# Patient Record
Sex: FEMALE | Race: WHITE | Employment: FULL TIME | URBAN - METROPOLITAN AREA
[De-identification: names, ages, dates, MRNs, and addresses within clinical notes are randomized per-mention and may not be internally consistent; named-entity substitution may affect disease eponyms.]

---

## 2017-08-23 ENCOUNTER — HOSPITAL ENCOUNTER (OUTPATIENT)
Age: 42
Discharge: HOME | End: 2017-08-23
Attending: FAMILY MEDICINE
Payer: COMMERCIAL

## 2017-08-23 VITALS
SYSTOLIC BLOOD PRESSURE: 115 MMHG | WEIGHT: 165 LBS | OXYGEN SATURATION: 97 % | TEMPERATURE: 98 F | RESPIRATION RATE: 22 BRPM | DIASTOLIC BLOOD PRESSURE: 75 MMHG | HEART RATE: 83 BPM

## 2017-08-23 DIAGNOSIS — S61.211A LACERATION OF LEFT INDEX FINGER WITHOUT FOREIGN BODY WITHOUT DAMAGE TO NAIL, INITIAL ENCOUNTER: Primary | ICD-10-CM

## 2017-08-23 PROCEDURE — 99203 OFFICE O/P NEW LOW 30 MIN: CPT

## 2017-08-23 PROCEDURE — 64450 NJX AA&/STRD OTHER PN/BRANCH: CPT

## 2017-08-23 PROCEDURE — 99204 OFFICE O/P NEW MOD 45 MIN: CPT

## 2017-08-23 RX ORDER — ONDANSETRON 4 MG/1
4 TABLET, ORALLY DISINTEGRATING ORAL EVERY 12 HOURS PRN
Qty: 10 TABLET | Refills: 0 | Status: SHIPPED | OUTPATIENT
Start: 2017-08-23 | End: 2017-08-30

## 2017-08-23 RX ORDER — HYDROCODONE BITARTRATE AND ACETAMINOPHEN 5; 325 MG/1; MG/1
1 TABLET ORAL ONCE
Status: COMPLETED | OUTPATIENT
Start: 2017-08-23 | End: 2017-08-23

## 2017-08-23 RX ORDER — HYDROCODONE BITARTRATE AND ACETAMINOPHEN 5; 325 MG/1; MG/1
1 TABLET ORAL EVERY 6 HOURS PRN
Qty: 30 TABLET | Refills: 1 | Status: SHIPPED | OUTPATIENT
Start: 2017-08-23 | End: 2017-08-30

## 2017-08-23 RX ORDER — GINSENG 100 MG
CAPSULE ORAL ONCE
Status: COMPLETED | OUTPATIENT
Start: 2017-08-23 | End: 2017-08-23

## 2017-08-24 ENCOUNTER — HOSPITAL ENCOUNTER (OUTPATIENT)
Age: 42
Discharge: HOME | End: 2017-08-24
Attending: PEDIATRICS
Payer: COMMERCIAL

## 2017-08-24 VITALS
SYSTOLIC BLOOD PRESSURE: 133 MMHG | OXYGEN SATURATION: 100 % | DIASTOLIC BLOOD PRESSURE: 91 MMHG | HEIGHT: 68 IN | RESPIRATION RATE: 16 BRPM | TEMPERATURE: 98 F | WEIGHT: 160 LBS | BODY MASS INDEX: 24.25 KG/M2 | HEART RATE: 74 BPM

## 2017-08-24 DIAGNOSIS — S61.311D LACERATION OF LEFT INDEX FINGER WITHOUT FOREIGN BODY WITH DAMAGE TO NAIL, SUBSEQUENT ENCOUNTER: Primary | ICD-10-CM

## 2017-08-24 PROCEDURE — 99212 OFFICE O/P EST SF 10 MIN: CPT

## 2017-08-24 NOTE — ED NOTES
Last dose of motrin 600 mg taken 30 mins ago. Pt was seen last night for laceration and states after changing dressing it began to bleed and became more painful.  Pt arrives with dressing in place and blood noted to dressing, bleeding controlled and no acti

## 2017-08-24 NOTE — ED PROVIDER NOTES
Patient Seen in: [address]    History   Patient presents with:  Laceration Abrasion (integumentary)    Stated Complaint: CUT FINGER    HPI    HPI: Teresa Reynaga is a 40year old female who presents after an injury to the body  NEURO:Sensation to touch is intact. SKIN: No open wounds, no rashes. PSYCH: Normal affect. Calm and cooperative.     Differential diagnosis to include fracture vs. Strain/sprain vs. contusion        ED Course   Labs Reviewed - No data to display  Wo

## 2017-08-24 NOTE — ED NOTES
Pt verbalized understanding of DC papers. Answered all questions. Discussed side effects of Norco. Pt will come back to have wound checked. Pt appears stable.   will drive pt home

## 2017-08-24 NOTE — ED INITIAL ASSESSMENT (HPI)
Pt was seen in IC last night for laceration reports changed bandage and began to bleed. States needs bandage changed and is complaining of worse pain. Was given RX for norco that she has not filled. Bandage in place bleeding controlled.

## 2017-08-24 NOTE — ED INITIAL ASSESSMENT (HPI)
Pt was chopping vegetables at home around 1930. Lac to left index finger. Unable to stop bleeding at home.

## 2017-08-24 NOTE — ED PROVIDER NOTES
Patient presents with:  Laceration Abrasion (integumentary)      HPI:     Moi Velazquez is a 40year old female who presents today for follow-up of left index finger injury sustained last night.   She was using a knife and cut off her distal fingertip throug appointment as soon as possible for a visit in 1 day

## 2019-01-23 ENCOUNTER — OFFICE VISIT (OUTPATIENT)
Dept: OBGYN CLINIC | Facility: CLINIC | Age: 44
End: 2019-01-23
Payer: COMMERCIAL

## 2019-01-23 VITALS
BODY MASS INDEX: 28.59 KG/M2 | WEIGHT: 184.31 LBS | HEIGHT: 67.5 IN | DIASTOLIC BLOOD PRESSURE: 74 MMHG | SYSTOLIC BLOOD PRESSURE: 106 MMHG

## 2019-01-23 DIAGNOSIS — Z12.4 SCREENING FOR MALIGNANT NEOPLASM OF THE CERVIX: ICD-10-CM

## 2019-01-23 DIAGNOSIS — Z30.431 ENCOUNTER FOR ROUTINE CHECKING OF INTRAUTERINE CONTRACEPTIVE DEVICE (IUD): ICD-10-CM

## 2019-01-23 DIAGNOSIS — R10.30 LOWER ABDOMINAL PAIN: ICD-10-CM

## 2019-01-23 DIAGNOSIS — Z01.419 WOMEN'S ANNUAL ROUTINE GYNECOLOGICAL EXAMINATION: Primary | ICD-10-CM

## 2019-01-23 DIAGNOSIS — K92.1 HEMATOCHEZIA: ICD-10-CM

## 2019-01-23 PROCEDURE — 99386 PREV VISIT NEW AGE 40-64: CPT | Performed by: OBSTETRICS & GYNECOLOGY

## 2019-01-23 PROCEDURE — 87624 HPV HI-RISK TYP POOLED RSLT: CPT | Performed by: OBSTETRICS & GYNECOLOGY

## 2019-01-23 NOTE — PROGRESS NOTES
NEW GYN H&P     1/23/2019  9:01 AM    CC: Patient is here to establish care    HPI: Patient is a 38year old [id number] LMP 1/20/2019 here for annual gyne exam, due for PAP and mammogram. Has concerns about recent bright red blood in stool with diffuse lower Known Problems Maternal Grandmother    • No Known Problems Maternal Grandfather    • No Known Problems Paternal Grandmother    • No Known Problems Paternal Grandfather      Social History    Socioeconomic History      Marital status:       Spouse na normal   Bladder: well supported  Vagina: normal mucosa, no lesions, no discharge   Uterus: normal size, mobile, nontender  Cervix: dark blood from recent menses at normal os, IUD string seen, no lesions or bleeding  Adnexa:normal size, bilaterally nontend

## 2019-01-24 ENCOUNTER — OFFICE VISIT (OUTPATIENT)
Dept: INTERNAL MEDICINE CLINIC | Facility: CLINIC | Age: 44
End: 2019-01-24
Payer: COMMERCIAL

## 2019-01-24 VITALS
HEART RATE: 68 BPM | OXYGEN SATURATION: 100 % | RESPIRATION RATE: 22 BRPM | SYSTOLIC BLOOD PRESSURE: 106 MMHG | HEIGHT: 67.5 IN | WEIGHT: 185 LBS | TEMPERATURE: 98 F | DIASTOLIC BLOOD PRESSURE: 60 MMHG | BODY MASS INDEX: 28.7 KG/M2

## 2019-01-24 DIAGNOSIS — R10.32 LEFT LOWER QUADRANT PAIN: ICD-10-CM

## 2019-01-24 DIAGNOSIS — Z00.00 GENERAL MEDICAL EXAM: ICD-10-CM

## 2019-01-24 DIAGNOSIS — K92.1: Primary | ICD-10-CM

## 2019-01-24 DIAGNOSIS — Z80.9 FAMILY HISTORY OF CANCER: ICD-10-CM
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
## 2019-01-24 PROBLEM — N60.01 CYST OF RIGHT BREAST: Status: ACTIVE | Noted: 2017-05-09

## 2019-01-24 PROBLEM — IMO0002 GENETIC COUNSELING AND TESTING: Status: ACTIVE | Noted: 2019-01-23
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
## 2019-01-24 LAB — HPV I/H RISK 1 DNA SPEC QL NAA+PROBE: NEGATIVE

## 2019-01-24 PROCEDURE — 99204 OFFICE O/P NEW MOD 45 MIN: CPT | Performed by: INTERNAL MEDICINE

## 2019-01-24 RX ORDER — HYDROCODONE BITARTRATE AND ACETAMINOPHEN 5; 325 MG/1; MG/1
TABLET ORAL
COMMUNITY

## 2019-01-24 RX ORDER — DOXYCYCLINE HYCLATE 50 MG/1
CAPSULE ORAL
COMMUNITY
End: 2019-01-24 | Stop reason: ALTCHOICE

## 2019-01-24 RX ORDER — CEPHALEXIN 500 MG/1
CAPSULE ORAL
COMMUNITY
End: 2019-01-24 | Stop reason: ALTCHOICE

## 2019-01-24 NOTE — PROGRESS NOTES
HPI:    Patient ID: Qi Lynn is a 38year old female. HPI   Patient is here to establish care. Previously healthy until recently since a month ago has intermittent loose stool accompanied by diffuse lower abdominal pain .  Noticed bright red bloo rectal pain. LLQ pain    Genitourinary: Negative for dysuria and breast mass. Musculoskeletal: Negative for joint swelling and joint pain. Allergic/Immunologic: Negative for immunocompromised state.    Neurological: Negative for dizziness, syncop diverticulitis, AVM. Encouraged to eat more fibers. Avoid popcorn, nuts. Consult GI , Dr. Marcell Olvera. . May need colonoscopy. - GI STOOL PANEL BY PCR; Future  - CBC W AUTO DIFF; Future  - COMP METABOLIC PANEL (14);  Future  - SED Loren WOOD

## 2021-03-30 ENCOUNTER — TELEMEDICINE (OUTPATIENT)
Dept: TELEHEALTH | Age: 46
End: 2021-03-30

## 2021-03-30 DIAGNOSIS — L03.116 CELLULITIS OF LEFT ANKLE: Primary | ICD-10-CM

## 2021-03-30 PROCEDURE — 99213 OFFICE O/P EST LOW 20 MIN: CPT | Performed by: NURSE PRACTITIONER

## 2021-03-30 RX ORDER — AMOXICILLIN AND CLAVULANATE POTASSIUM 875; 125 MG/1; MG/1
1 TABLET, FILM COATED ORAL 2 TIMES DAILY
Qty: 20 TABLET | Refills: 0 | Status: SHIPPED | OUTPATIENT
Start: 2021-03-30 | End: 2021-04-09

## 2021-03-30 NOTE — PATIENT INSTRUCTIONS
Cellulitis  Cellulitis is an infection of the deep layers of skin. A break in the skin, such as a cut or scratch, can let bacteria under the skin. If the bacteria get to deep layers of the skin, it can be serious.  If not treated, cellulitis can get into days on antibiotics  Ryan last reviewed this educational content on 8/1/2019  © 5797-0401 [employer]. All rights reserved. This information is not intended as a substitute for professional medical care.  Always follow your healthcare prof have been prescribed, be sure to take them as directed until they are all finished. · You may use over-the-counter pain medicine to control pain, unless another pain medicine was prescribed.  Talk with your healthcare provider before using acetaminophen or

## 2021-03-30 NOTE — PROGRESS NOTES
Gennaro Neves is a 40year old female. CHIEF COMPLAINT:   Patient presents with: Other: possible Lyme disease    Encounter was conducted via video.     HPI:   Patient states she was in a shoe store 3 days ago when her left ankle suddenly felt uncomfortable Prescriptions Disp Refills   • Amoxicillin-Pot Clavulanate 875-125 MG Oral Tab 20 tablet 0     Sig: Take 1 tablet by mouth 2 (two) times daily for 10 days. Patient Instructions     Cellulitis  Cellulitis is an infection of the deep layers of skin.  A right away if any of these occur:  · Red areas that spread  · Swelling or pain that gets worse  · Fluid leaking from the skin (pus)  · Fever higher of 100.4º F (38.0º C) or higher after 2 days on antibiotics  Ryan last reviewed this educational content glaucoma or if you are a man with trouble urinating due to an enlarged prostate. Some antihistamines cause less drowsiness and are a good choice for daytime use.   · If oral antibiotics have been prescribed, be sure to take them as directed until they are a

## 2022-06-01 ENCOUNTER — OFFICE VISIT (OUTPATIENT)
Dept: INTERNAL MEDICINE CLINIC | Facility: CLINIC | Age: 47
End: 2022-06-01
Payer: COMMERCIAL

## 2022-06-01 VITALS
BODY MASS INDEX: 30.71 KG/M2 | TEMPERATURE: 98 F | WEIGHT: 202.63 LBS | DIASTOLIC BLOOD PRESSURE: 64 MMHG | SYSTOLIC BLOOD PRESSURE: 110 MMHG | OXYGEN SATURATION: 98 % | HEART RATE: 83 BPM | HEIGHT: 68 IN

## 2022-06-01 DIAGNOSIS — L30.9 ECZEMA, UNSPECIFIED TYPE: ICD-10-CM

## 2022-06-01 DIAGNOSIS — K29.00 OTHER ACUTE GASTRITIS WITHOUT HEMORRHAGE: ICD-10-CM

## 2022-06-01 DIAGNOSIS — Z12.11 SCREENING FOR COLON CANCER: ICD-10-CM

## 2022-06-01 DIAGNOSIS — R10.30 LOWER ABDOMINAL PAIN: ICD-10-CM

## 2022-06-01 DIAGNOSIS — Z13.29 SCREENING FOR THYROID DISORDER: ICD-10-CM

## 2022-06-01 DIAGNOSIS — Z01.419 WELL WOMAN EXAM: ICD-10-CM

## 2022-06-01 DIAGNOSIS — Z13.0 SCREENING FOR DEFICIENCY ANEMIA: ICD-10-CM

## 2022-06-01 DIAGNOSIS — Z30.431 ENCOUNTER FOR ROUTINE CHECKING OF INTRAUTERINE CONTRACEPTIVE DEVICE (IUD): Primary | ICD-10-CM

## 2022-06-01 DIAGNOSIS — Z12.31 ENCOUNTER FOR SCREENING MAMMOGRAM FOR MALIGNANT NEOPLASM OF BREAST: ICD-10-CM

## 2022-06-01 DIAGNOSIS — Z13.220 SCREENING FOR LIPOID DISORDERS: ICD-10-CM

## 2022-06-01 DIAGNOSIS — E55.9 VITAMIN D DEFICIENCY: ICD-10-CM

## 2022-06-01 DIAGNOSIS — Z00.00 ANNUAL PHYSICAL EXAM: ICD-10-CM

## 2022-06-01 PROCEDURE — 3074F SYST BP LT 130 MM HG: CPT | Performed by: INTERNAL MEDICINE

## 2022-06-01 PROCEDURE — 99386 PREV VISIT NEW AGE 40-64: CPT | Performed by: INTERNAL MEDICINE

## 2022-06-01 PROCEDURE — 3078F DIAST BP <80 MM HG: CPT | Performed by: INTERNAL MEDICINE

## 2022-06-01 PROCEDURE — 3008F BODY MASS INDEX DOCD: CPT | Performed by: INTERNAL MEDICINE

## 2022-06-01 RX ORDER — OMEPRAZOLE 40 MG/1
40 CAPSULE, DELAYED RELEASE ORAL DAILY
Qty: 90 CAPSULE | Refills: 3 | Status: SHIPPED | OUTPATIENT
Start: 2022-06-01 | End: 2023-05-27

## 2022-07-21 ENCOUNTER — HOSPITAL ENCOUNTER (OUTPATIENT)
Age: 47
Discharge: HOME | End: 2022-07-21
Payer: COMMERCIAL

## 2022-07-21 VITALS
SYSTOLIC BLOOD PRESSURE: 127 MMHG | DIASTOLIC BLOOD PRESSURE: 84 MMHG | HEART RATE: 76 BPM | RESPIRATION RATE: 20 BRPM | TEMPERATURE: 98 F | OXYGEN SATURATION: 98 %

## 2022-07-21 DIAGNOSIS — S05.02XA ABRASION OF LEFT CORNEA, INITIAL ENCOUNTER: Primary | ICD-10-CM

## 2022-07-21 DIAGNOSIS — H10.32 ACUTE CONJUNCTIVITIS OF LEFT EYE, UNSPECIFIED ACUTE CONJUNCTIVITIS TYPE: ICD-10-CM

## 2022-07-21 PROCEDURE — 99213 OFFICE O/P EST LOW 20 MIN: CPT | Performed by: EMERGENCY MEDICINE

## 2022-07-21 RX ORDER — TETRACAINE HYDROCHLORIDE 5 MG/ML
1 SOLUTION OPHTHALMIC ONCE
Status: DISCONTINUED | OUTPATIENT
Start: 2022-07-21 | End: 2022-07-21

## 2022-07-21 RX ORDER — TETRACAINE HYDROCHLORIDE 5 MG/ML
1 SOLUTION OPHTHALMIC ONCE
Status: COMPLETED | OUTPATIENT
Start: 2022-07-21 | End: 2022-07-21

## 2022-07-21 RX ORDER — PURIFIED WATER 986 MG/ML
1 SOLUTION OPHTHALMIC ONCE
Status: DISCONTINUED | OUTPATIENT
Start: 2022-07-21 | End: 2022-07-21

## 2022-07-21 RX ORDER — PURIFIED WATER 986 MG/ML
1 SOLUTION OPHTHALMIC ONCE
Status: COMPLETED | OUTPATIENT
Start: 2022-07-21 | End: 2022-07-21

## 2022-07-21 RX ORDER — TOBRAMYCIN AND DEXAMETHASONE 3; 1 MG/ML; MG/ML
2 SUSPENSION/ DROPS OPHTHALMIC
Qty: 1 EACH | Refills: 0 | Status: SHIPPED | OUTPATIENT
Start: 2022-07-21 | End: 2022-07-26

## 2022-07-21 RX ORDER — IBUPROFEN 600 MG/1
600 TABLET ORAL ONCE
Status: COMPLETED | OUTPATIENT
Start: 2022-07-21 | End: 2022-07-21

## 2022-07-21 RX ORDER — IBUPROFEN 600 MG/1
600 TABLET ORAL EVERY 6 HOURS PRN
Qty: 20 TABLET | Refills: 0 | Status: SHIPPED | OUTPATIENT
Start: 2022-07-21

## 2022-07-21 NOTE — ED INITIAL ASSESSMENT (HPI)
Pt came in due to left eye pain that started this morning. Pt stated she was putting on make up and felt like something went inside her left eye. Pt stated she wears contacts and removed her contact lenses. Pt stated she began to have pain afterwards and sensitivity with light in left eye. Pt has easy non labored respirations.

## 2022-07-25 ENCOUNTER — TELEPHONE (OUTPATIENT)
Dept: OPHTHALMOLOGY | Facility: CLINIC | Age: 47
End: 2022-07-25

## 2023-02-16 NOTE — PATIENT INSTRUCTIONS
You were seen in clinic today for:    Chest pain  Based on exam, this is likely due to stomach symptoms, possibly related to recent activities from the weekend  -Your EKG was reassuring, there is a slight abnormality that we should monitor over time  - Lets have you obtain the blood work to rule out any other secondary causes - specifically, we need to rule out heart issues and blood clot as these may require ER evaluation  - We are also checking on the liver, gallbladder, pancreas levels as these are more benign causes of your symptoms  - Finally, if everything comes back normal, we should consider gastritis/acid reflux that we can manage medically  - We discussed red flag signs and symptoms of warrant immediate ER evaluation including severe 10 out of 10 chest pain, shortness of breath, shortness of breath with minimal exertion    Back pain with numbness and tingling  This may be related to your viral illness in 2020. You remain neurologically intact however.   We should consider sciatica or musculosketeal pain  - Let's await the work ups as above prior to initiating treatment  -Acetaminophen 500-650 mg every 4-6 hours as needed for pain relief  -Ibuprofen 200-400 mg every 8 hours as needed for anti-inflammatory and pain relief    Return to clinic in 4-6 weeks for follow-up

## 2023-02-17 ENCOUNTER — LAB ENCOUNTER (OUTPATIENT)
Dept: LAB | Age: 48
End: 2023-02-17
Attending: INTERNAL MEDICINE
Payer: COMMERCIAL

## 2023-02-17 ENCOUNTER — OFFICE VISIT (OUTPATIENT)
Dept: INTERNAL MEDICINE CLINIC | Facility: CLINIC | Age: 48
End: 2023-02-17

## 2023-02-17 VITALS — BODY MASS INDEX: 31 KG/M2 | HEIGHT: 68 IN

## 2023-02-17 DIAGNOSIS — M54.10 BACK PAIN WITH RADICULOPATHY: ICD-10-CM

## 2023-02-17 DIAGNOSIS — M25.511 ACUTE PAIN OF BOTH SHOULDERS: ICD-10-CM

## 2023-02-17 DIAGNOSIS — R07.9 CHEST PAIN, UNSPECIFIED TYPE: Primary | ICD-10-CM

## 2023-02-17 DIAGNOSIS — M25.512 ACUTE PAIN OF BOTH SHOULDERS: ICD-10-CM

## 2023-02-17 DIAGNOSIS — L30.9 ECZEMA, UNSPECIFIED TYPE: ICD-10-CM

## 2023-02-17 DIAGNOSIS — R20.2 PARESTHESIAS: ICD-10-CM

## 2023-02-17 DIAGNOSIS — R07.9 CHEST PAIN, UNSPECIFIED TYPE: ICD-10-CM

## 2023-02-17 LAB
ALBUMIN SERPL-MCNC: 3.6 G/DL (ref 3.4–5)
ALBUMIN/GLOB SERPL: 0.9 {RATIO} (ref 1–2)
ALP LIVER SERPL-CCNC: 39 U/L
ALT SERPL-CCNC: 24 U/L
ANION GAP SERPL CALC-SCNC: 7 MMOL/L (ref 0–18)
AST SERPL-CCNC: 20 U/L (ref 15–37)
ATRIAL RATE: 73 BPM
BASOPHILS # BLD AUTO: 0.04 X10(3) UL (ref 0–0.2)
BASOPHILS NFR BLD AUTO: 0.7 %
BILIRUB SERPL-MCNC: 0.5 MG/DL (ref 0.1–2)
BILIRUB UR QL: NEGATIVE
BUN BLD-MCNC: 19 MG/DL (ref 7–18)
BUN/CREAT SERPL: 21.1 (ref 10–20)
CALCIUM BLD-MCNC: 8.7 MG/DL (ref 8.5–10.1)
CHLORIDE SERPL-SCNC: 107 MMOL/L (ref 98–112)
CO2 SERPL-SCNC: 29 MMOL/L (ref 21–32)
COLOR UR: YELLOW
CREAT BLD-MCNC: 0.9 MG/DL
D DIMER PPP FEU-MCNC: <0.27 UG/ML FEU (ref ?–0.5)
DEPRECATED RDW RBC AUTO: 42.3 FL (ref 35.1–46.3)
EOSINOPHIL # BLD AUTO: 0.21 X10(3) UL (ref 0–0.7)
EOSINOPHIL NFR BLD AUTO: 3.5 %
ERYTHROCYTE [DISTWIDTH] IN BLOOD BY AUTOMATED COUNT: 11.9 % (ref 11–15)
FASTING STATUS PATIENT QL REPORTED: NO
GFR SERPLBLD BASED ON 1.73 SQ M-ARVRAT: 79 ML/MIN/1.73M2 (ref 60–?)
GLOBULIN PLAS-MCNC: 3.9 G/DL (ref 2.8–4.4)
GLUCOSE BLD-MCNC: 90 MG/DL (ref 70–99)
GLUCOSE UR-MCNC: NEGATIVE MG/DL
HCT VFR BLD AUTO: 40.8 %
HGB BLD-MCNC: 13.3 G/DL
HGB UR QL STRIP.AUTO: NEGATIVE
IMM GRANULOCYTES # BLD AUTO: 0.01 X10(3) UL (ref 0–1)
IMM GRANULOCYTES NFR BLD: 0.2 %
KETONES UR-MCNC: NEGATIVE MG/DL
LEUKOCYTE ESTERASE UR QL STRIP.AUTO: NEGATIVE
LIPASE SERPL-CCNC: 201 U/L (ref 73–393)
LIPASE SERPL-CCNC: 47 U/L (ref 13–75)
LYMPHOCYTES # BLD AUTO: 1.46 X10(3) UL (ref 1–4)
LYMPHOCYTES NFR BLD AUTO: 24.5 %
MCH RBC QN AUTO: 31.4 PG (ref 26–34)
MCHC RBC AUTO-ENTMCNC: 32.6 G/DL (ref 31–37)
MCV RBC AUTO: 96.2 FL
MONOCYTES # BLD AUTO: 0.58 X10(3) UL (ref 0.1–1)
MONOCYTES NFR BLD AUTO: 9.7 %
NEUTROPHILS # BLD AUTO: 3.67 X10 (3) UL (ref 1.5–7.7)
NEUTROPHILS # BLD AUTO: 3.67 X10(3) UL (ref 1.5–7.7)
NEUTROPHILS NFR BLD AUTO: 61.4 %
NITRITE UR QL STRIP.AUTO: NEGATIVE
OSMOLALITY SERPL CALC.SUM OF ELEC: 298 MOSM/KG (ref 275–295)
P AXIS: 58 DEGREES
P-R INTERVAL: 156 MS
PH UR: 7 [PH] (ref 5–8)
PLATELET # BLD AUTO: 249 10(3)UL (ref 150–450)
POTASSIUM SERPL-SCNC: 4.6 MMOL/L (ref 3.5–5.1)
PROT SERPL-MCNC: 7.5 G/DL (ref 6.4–8.2)
PROT UR-MCNC: 30 MG/DL
Q-T INTERVAL: 378 MS
QRS DURATION: 76 MS
QTC CALCULATION (BEZET): 416 MS
R AXIS: -7 DEGREES
RBC # BLD AUTO: 4.24 X10(6)UL
SODIUM SERPL-SCNC: 143 MMOL/L (ref 136–145)
SP GR UR STRIP: 1.02 (ref 1–1.03)
T AXIS: 17 DEGREES
TROPONIN I HIGH SENSITIVITY: 4 NG/L
UROBILINOGEN UR STRIP-ACNC: <2
VENTRICULAR RATE: 73 BPM
VIT C UR-MCNC: NEGATIVE MG/DL
WBC # BLD AUTO: 6 X10(3) UL (ref 4–11)

## 2023-02-17 PROCEDURE — 85379 FIBRIN DEGRADATION QUANT: CPT

## 2023-02-17 PROCEDURE — 85025 COMPLETE CBC W/AUTO DIFF WBC: CPT

## 2023-02-17 PROCEDURE — 36415 COLL VENOUS BLD VENIPUNCTURE: CPT

## 2023-02-17 PROCEDURE — 83690 ASSAY OF LIPASE: CPT

## 2023-02-17 PROCEDURE — 93000 ELECTROCARDIOGRAM COMPLETE: CPT | Performed by: INTERNAL MEDICINE

## 2023-02-17 PROCEDURE — 81001 URINALYSIS AUTO W/SCOPE: CPT

## 2023-02-17 PROCEDURE — 84484 ASSAY OF TROPONIN QUANT: CPT

## 2023-02-17 PROCEDURE — 87086 URINE CULTURE/COLONY COUNT: CPT

## 2023-02-17 PROCEDURE — 80053 COMPREHEN METABOLIC PANEL: CPT

## 2023-02-17 PROCEDURE — 99215 OFFICE O/P EST HI 40 MIN: CPT | Performed by: INTERNAL MEDICINE

## 2023-02-18 ENCOUNTER — TELEPHONE (OUTPATIENT)
Dept: INTERNAL MEDICINE CLINIC | Facility: CLINIC | Age: 48
End: 2023-02-18

## 2023-02-18 RX ORDER — OMEPRAZOLE 40 MG/1
40 CAPSULE, DELAYED RELEASE ORAL DAILY
Qty: 30 CAPSULE | Refills: 0 | Status: SHIPPED | OUTPATIENT
Start: 2023-02-18

## 2023-02-18 NOTE — TELEPHONE ENCOUNTER
Called patient with results    Tril of PPI recommended omeprazole 40 mg once a day for least 2 weeks even if symptoms improve. She will give this a try and notify us if no significant improvements.     Chest pain is better today from yesterday

## 2023-02-22 ENCOUNTER — HOSPITAL ENCOUNTER (OUTPATIENT)
Dept: MAMMOGRAPHY | Age: 48
Discharge: HOME | End: 2023-02-22
Attending: INTERNAL MEDICINE
Payer: COMMERCIAL

## 2023-02-22 DIAGNOSIS — Z12.31 ENCOUNTER FOR SCREENING MAMMOGRAM FOR MALIGNANT NEOPLASM OF BREAST: ICD-10-CM

## 2023-02-22 PROCEDURE — 77063 BREAST TOMOSYNTHESIS BI: CPT | Performed by: INTERNAL MEDICINE

## 2023-02-22 PROCEDURE — 77067 SCR MAMMO BI INCL CAD: CPT | Performed by: INTERNAL MEDICINE

## 2023-02-24 ENCOUNTER — TELEPHONE (OUTPATIENT)
Dept: INTERNAL MEDICINE CLINIC | Facility: CLINIC | Age: 48
End: 2023-02-24

## 2023-02-24 NOTE — TELEPHONE ENCOUNTER
Please notify the patient that her mammogram from 2/22 was normal.  Repeat mammogram and breast exam in 1 year

## 2023-11-24 NOTE — PATIENT INSTRUCTIONS
- You were seen in clinic for regular annual check-up. We have ordered labs for you and we will call you with the results. Please obtain the bloodwork fasting for 12 hours. OK to drink water the day of your blood draw. We have the lab downstairs on the first floor. No appointment is necessary. Lab hours are Monday-Friday 7:30 AM to 4:45 PM.  There may be Saturday hours 7 AM-11:00 AM as well. Otherwise you can obtain the blood tests on the weekends at the main hospital or local immediate care/urgent care within the [facility]. For back pain, we recommended:  - As this has been a recurring issue, we will obtain x-ray of the low back  - May benefit from use of steroid medication  to treat acute phase  -Would recommend initial trial of conservative therapy:     -Acetaminophen 500-650 mg every 4-6 hours as needed for pain relief  -Ibuprofen 200-400 mg every 8 hours as needed for anti-inflammatory and pain relief  -Focus on conservative management for now. Can consider physical therapy in the future    - Today, we focused on weight management: We will obtain fasting blood work to rule out secondary causes, though low suspicion for secondary cause     We discussed the importance of net caloric reduction by:  -Optimizing nutrition. Would benefit to focus on high-fiber intake with fruits, vegetables. Opt for leaner meats such as chicken/fish/turkey/pork, baked rather than fried/use of high oil content. Low-fat dairy can be incorporated. - The goal for nutrition is to gradually decrease calories per day. We discussed other methods to achieve this such as decreasing excessive snacking, decreasing portions, decrease the frequency of eating out. - Weight gain is best obtained by modifying food intake. However, exercise can help achieve caloric reduction by burning off calories. Recommend at least 150 minutes of moderate intensity exercise for weight maintenance.   Higher intensity exercise for longer periods of time can also promote weight loss   -These lifestyle changes should be viewed as long-term even with weight loss. This promotes overall general health and decrease risk for chronic diseases in the future. - We discussed starting naltrexone 50 mg once a day. This medication can help reduce down the cravings and reward system of alcohol use. If we try to cut down or abstain from alcohol altogether, this may help promote further weight loss. - You may be due for well woman exam with OBGYN including pap smear with Dr. Libra Bhatti  - Please make an appointment with GI for colonoscopy  - Vaccines you may be due for: flu shot, COVID #5  - Please continue to eat a varied diet including recommended servings of vegetables, fruits, and low fat dairy. Minimize high saturated fats (such as fast foods) and high sugar intake (such as soda)  - We recommend 150 minutes of moderate intensity exercise (brisk walking, swimming) weekly to maintain your current weight. Targeted weight loss will require more vigorous exercise or more than 150 minutes/week.     Return to clinic in 3-4 months for follow-up

## 2023-11-24 NOTE — H&P
Johnny Acuna is a 53year old female. HPI:     Chief Complaint   Patient presents with    Weight Management    Low Back Pain     Consistent x1 month. Routine Physical     Ms. Britney Galvan is a 53year old female PMHX eczema Coming in for annual physical examination. Overall, she is been having 1 month. No inciting event. Low back, sharp pain worse on the R side. 2-3 days before trying to get the walking better. Heating pads, salon pas. Did have sciatica during pregnancy. Has improved significantly 75% improvement. 2 steps forward and 1 step back. Aggravating factors: lack of sleep, dehydration worsens the next. She is concerned about an ovarian cyst which has happened before. R eye surgery of keratoconus, no pain. Dr. Stephen Arnold is her ophthalmologist     She feels like she gains 3 lbs back for every lb she loses. She saw this improve     She does have 3-4 drinks at work social events, 3-4x a week. I reviewed and updated the PMHx, FamHx, medications, allergies, and SocHx as below with the patient    OB/GYN - Dr. Clark Guard - 10 years. Last menstrual period: last week, never strong flow. 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 Proteins - chicken, fish, beans. Not as much water. - Physical Activity: swimming 4-5 times a week, > 1 mile a day. The back has been flaring up      HISTORY:  Past Medical History:   Diagnosis Date    Eczema     History of use of contraceptive intrauterine device (IUD) 07/2013    MIrena IUD inserted 07/2013    Lower back pain       Past Surgical History:   Procedure Laterality Date    D & C  2009    SPAB    CHERIE LOCALIZATION WIRE 1 SITE RIGHT (CPT=19281) Right     done about 7-8yrs ago.       Family History   Problem Relation Age of Onset    Heart Disease Father         LENNY fede Other (Other) Father         Lung disease     Breast Cancer Mother 46    Other (Hep C) Mother         HEp C    No Known Problems Maternal Grandmother     No Known Problems Maternal Grandfather     No Known Problems Paternal Grandmother     No Known Problems Paternal Grandfather     Other (colon  disease) Maternal Uncle 20        possible cancer    Other (lung ca) Other       Social History:   Social History     Socioeconomic History    Marital status:    Occupational History    Occupation: Finance   Tobacco Use    Smoking status: Never    Smokeless tobacco: Never   Vaping Use    Vaping Use: Never used   Substance and Sexual Activity    Alcohol use: Yes     Comment: Moderate; 2 glasses of wine a day on average    Drug use: No    Sexual activity: Yes     Partners: Male     Birth control/protection: I.U.D., Mirena     Comment: MIrena IUD inserted 07/2013   Other Topics Concern    Blood Transfusions No   Social History Narrative    Per patient no H/O abuse         Medications (Active prior to today's visit):  No current outpatient medications on file. Allergies:   Allergies   Allergen Reactions    No Known Allergies UNKNOWN         ROS:   Positive Findings indicated in BOLD    Constitutional: Fever, Chills, Weight Gain, Weight Loss, Night Sweats, Fatigue, Malaise  ENT/Mouth:  Hearing Changes, Ear Pain, Nasal Congestion, Sinus Pain, Hoarseness, Sore throat, Rhinorrhea, Swallowing Difficulty  Eyes: Eye Pain, Swelling, Redness, Foreign Body, Discharge, Vision Changes  Cardiovascular: Chest Pain, SOB, PND, Dyspnea on Exertion, Orthopnea, Claudication, Edema, Palpitations  Respiratory: Cough, Sputum, Wheezing, Shortness of breath  Gastrointestinal: Nausea, Vomiting, Diarrhea, Constipation, Pain, Heartburn, Dysphagia, Bloody stools, Tarry stools  Genitourinary: Dysmenorrhea, Dysuria, Urinary Frequency, Hematuria, Urinary Incontinence, Urgency,  Flank Pain  Musculoskeletal: Arthralgias, Myalgias, Joint Swelling, Joint Stiffness, Back Pain, Neck Pain  Integumentary: Skin Lesions, Pruritis, Hair Changes, Jaundice, Nail changes  Neuro: Weakness, Numbness, Paresthesias, Loss of Consciousness, Syncope, Dizziness, Headache, Falls  Psych: Anxiety, Depression, Insomnia, Suicidal Ideation, Homicidal ideation, Memory Changes  Heme/Lymph: Bruising, Bleeding, Lymphadenopathy  Endocrine: Polyuria, Polydipsia, Temperature Intolerance    PHYSICAL EXAM:   Vital Signs:  Blood pressure 118/82, pulse 75, resp. rate 16, height 5' 8\" (1.727 m), weight 201 lb 4 oz (91.3 kg), last menstrual period 11/20/2023, SpO2 98%, not currently breastfeeding. Constitutional: No acute distress. Alert and oriented x 3. Eyes: EOMI, PERRLA, clear sclera b/l  HENT: NCAT, Moist mucous membranes, Oropharynx without erythema or exudates  Neck: No JVD, no thyromegaly  Cardiovascular: S1, S2, no S3, no S4, Regular rate and rhythm, No murmurs/gallops/rubs. Vascular: Equal pulses 2+ carotids no bruits or thrills/radial/DP/PT bilaterally  Respiratory: Clear to auscultation bilaterally. No wheezes/rales/rhonchi  Gastrointestinal: Soft, + mild tenderness palpation x4 quadrants, nondistended. Positive bowel sounds x 4. No rebound tenderness.  No hepatomegaly, No splenomegaly  Genitourinary: No CVA tenderness bilaterally  Neurologic: No focal neurological deficits, CN II-XII intact, light touch intact, MSK Strength 5/5 and symmetric in all extremities, normal gait, 2+ patellar tendon  Musculoskeletal: Full range of motion of all extremities, no clubbing/swelling/edema  Skin: No lesions, No erythema, no jaundice, Cap Refill < 2s  Psychiatric: Appropriate mood and affect  Heme/Lymph/Immune: No cervical LAD    Well woman exam - to be completed with OB/GYN        DATA REVIEWED   Labs:  Recent Results (from the past 8760 hour(s))   CBC W/ DIFFERENTIAL    Collection Time: 02/17/23 12:28 PM   Result Value Ref Range    WBC 6.0 4.0 - 11.0 x10(3) uL    RBC 4.24 3.80 - 5.30 x10(6)uL    HGB 13.3 12.0 - 16.0 g/dL    HCT 40.8 35.0 - 48.0 %    MCV 96.2 80.0 - 100.0 fL    MCH 31.4 26.0 - 34.0 pg    MCHC 32.6 31.0 - 37.0 g/dL    RDW-SD 42.3 35.1 - 46.3 fL    RDW 11.9 11.0 - 15.0 %    .0 150.0 - 450.0 10(3)uL    Neutrophil Absolute Prelim 3.67 1.50 - 7.70 x10 (3) uL    Neutrophil Absolute 3.67 1.50 - 7.70 x10(3) uL    Lymphocyte Absolute 1.46 1.00 - 4.00 x10(3) uL    Monocyte Absolute 0.58 0.10 - 1.00 x10(3) uL    Eosinophil Absolute 0.21 0.00 - 0.70 x10(3) uL    Basophil Absolute 0.04 0.00 - 0.20 x10(3) uL    Immature Granulocyte Absolute 0.01 0.00 - 1.00 x10(3) uL    Neutrophil % 61.4 %    Lymphocyte % 24.5 %    Monocyte % 9.7 %    Eosinophil % 3.5 %    Basophil % 0.7 %    Immature Granulocyte % 0.2 %     *Note: Due to a large number of results and/or encounters for the requested time period, some results have not been displayed. A complete set of results can be found in Results Review. Recent Results (from the past 8760 hour(s))   Comp Metabolic Panel (14) [E]    Collection Time: 02/17/23 12:28 PM   Result Value Ref Range    Glucose 90 70 - 99 mg/dL    Sodium 143 136 - 145 mmol/L    Potassium 4.6 3.5 - 5.1 mmol/L    Chloride 107 98 - 112 mmol/L    CO2 29.0 21.0 - 32.0 mmol/L    Anion Gap 7 0 - 18 mmol/L    BUN 19 (H) 7 - 18 mg/dL    Creatinine 0.90 0.55 - 1.02 mg/dL    BUN/CREA Ratio 21.1 (H) 10.0 - 20.0    Calcium, Total 8.7 8.5 - 10.1 mg/dL    Calculated Osmolality 298 (H) 275 - 295 mOsm/kg    eGFR-Cr 79 >=60 mL/min/1.73m2    ALT 24 13 - 56 U/L    AST 20 15 - 37 U/L    Alkaline Phosphatase 39 39 - 100 U/L    Bilirubin, Total 0.5 0.1 - 2.0 mg/dL    Total Protein 7.5 6.4 - 8.2 g/dL    Albumin 3.6 3.4 - 5.0 g/dL    Globulin  3.9 2.8 - 4.4 g/dL    A/G Ratio 0.9 (L) 1.0 - 2.0    Patient Fasting for CMP? No      *Note: Due to a large number of results and/or encounters for the requested time period, some results have not been displayed.  A complete set of results can be found in Results Review. No results found for: \"CHOLEST\", \"HDL\", \"LDL\", \"TRIGLY\", \"TRIG\"    Last A1c value was  % done  . Reviewed blood tests 1/28/2020 per Care Everywhere  - Vitamin H77, folic acid normal  - CMP: Unremarkable  - CBC: Unremarkable  - A1c 4.8  - TSH 1.38    Imaging:  MRI cervical spine 2/4/2020  IMPRESSION:     1. No mass or abnormal enhancement in the cervical spine. 2.  Minimal multilevel degenerative changes affect the cervical spine without significant   spinal canal or neural foraminal stenosis. 3.  Probable asymmetric increased signal in the left middle cranial fossa most likely   representing an arachnoid cyst, but incompletely evaluated. If clinically warranted,   dedicated brain MRI may be obtained. Mammogram 2/22/2023  Impression   CONCLUSION:   There is no mammographic evidence of malignancy in either breast. As long as patient's clinical breast exam remains unchanged, annual screening mammogram is recommended. BI-RADS CATEGORY:    DIAGNOSTIC CATEGORY 1--NEGATIVE ASSESSMENT. Pathology:  Pap smear 1/23/2019  Specimen Adequacy  Satisfactory for evaluation. Endocervical or metaplastic cells present    General Categorization      Negative for intraepithelial lesion or malignancy    Final Diagnosis Comment      Negative for intraepithelial lesion or malignancy   HPV High Risk mRNA      Negative  Normal                   ASSESSMENT/PLAN:        Back pain  Paresthesias  Described symmetric tingling in the hands and the forearms the arms, legs. This was after a viral illness. Was evaluated [facility] neurology, Dr. Christi Avilez. MRI cervical spine as above. . Was seen by neurology Dr. Richard Thomas at [facility] 3/3/2020, unclear cause. Neurological exam and work-up was unremarkable, patient elected to watch and wait declining EMG at that time.   Straight leg raise negative bilaterally suspect this is due neurological involvement  - As this has been a recurring issue, we will obtain x-ray of the low back  - May benefit from use of steroid medication to treat acute phase  -Would recommend initial trial of conservative therapy:     -Acetaminophen 500-650 mg every 4-6 hours as needed for pain relief  -Ibuprofen 200-400 mg every 8 hours as needed for anti-inflammatory and pain relief  -Focus on conservative management for now. Can consider physical therapy in the future    History of ovarian cyst  - This has precipitated back pain in the past, we will proceed with an ultrasound with specific views of the ovaries. Eczema  -Hydrocortisone 1 application twice a day on an as-needed basis. Weight management  Concern for weight gain without other associated symptoms concerning for secondary causes. Wt Readings from Last 6 Encounters:   11/27/23 201 lb 4 oz (91.3 kg)   06/01/22 202 lb 9.6 oz (91.9 kg)   01/24/19 185 lb (83.9 kg)   01/23/19 184 lb 4.8 oz (83.6 kg)   08/24/17 160 lb (72.6 kg)   08/23/17 165 lb (74.8 kg)          We will obtain fasting blood work to rule out secondary causes, though low suspicion for secondary cause     We discussed the importance of net caloric reduction by:  -Optimizing nutrition. Would benefit to focus on high-fiber intake with fruits, vegetables. Opt for leaner meats such as chicken/fish/turkey/pork, baked rather than fried/use of high oil content. Low-fat dairy can be incorporated. - The goal for nutrition is to gradually decrease calories per day. We discussed other methods to achieve this such as decreasing excessive snacking, decreasing portions, decrease the frequency of eating out. - Weight gain is best obtained by modifying food intake. However, exercise can help achieve caloric reduction by burning off calories. Recommend at least 150 minutes of moderate intensity exercise for weight maintenance.   Higher intensity exercise for longer periods of time can also promote weight loss   -These lifestyle changes should be viewed as long-term even with weight loss. This promotes overall general health and decrease risk for chronic diseases in the future.  -She is concerned that excessive alcohol use may be contributing to her difficulty with weight management. We will start naltrexone 50 mg once a day to help reduce cravings and reduce reinforcement of high alcohol use. Orders This Visit:  No orders of the defined types were placed in this encounter. Meds This Visit:  Requested Prescriptions      No prescriptions requested or ordered in this encounter       Imaging & Referrals:  [facility] CHAVEZ 2D+3D SCREENING BILAT (CPT=77067/97743)  OBG - INTERNAL  GASTRO - INTERNAL       Health Maintenance  HTN Screen: BP at goal  DM Screen: Check fasting sugar  HLD Screen: Check fasting lipid panel  HCV Screen: Considered low risk  HIV Screen: considered low risk  G/C/Syphilis: Considered low risk    Colon Cancer Screening (45-70): Given GI referral  Breast Cancer Screening (40-70):order given for mammogram for next year  Lung Cancer Screening (55-79 with 30 p/year and active < 15 years): Not indicated  AAA Screening (65-75 Hx of smoking): Not indicated  Pap smear - referral for OBGYN    Influenza:  Annually   Td/Tdap: Last Tdap 2016, due 2026  Zoster (50+): N/A  HPV (19-26): N/A  Pneumococcal: N/A    Immunization History   Administered Date(s) Administered    Covid-19 Vaccine Pfizer 30 mcg/0.3 ml 04/01/2021, 04/29/2021, 12/15/2021    Covid-19 Vaccine Pfizer Bivalent 30mcg/0.3mL 11/21/2022    FLUZONE 6 months and older PFS 0.5 ml (99374) 11/21/2022    Influenza 09/11/2013, 10/05/2014    TDAP 02/16/2016         Return to clinic in 3-4 months for follow-up    Nusrat Patino MD, 06/01/22, 4:20 PM

## 2023-11-27 ENCOUNTER — OFFICE VISIT (OUTPATIENT)
Dept: INTERNAL MEDICINE CLINIC | Facility: CLINIC | Age: 48
End: 2023-11-27

## 2023-11-27 VITALS
HEART RATE: 75 BPM | SYSTOLIC BLOOD PRESSURE: 118 MMHG | RESPIRATION RATE: 16 BRPM | HEIGHT: 68 IN | OXYGEN SATURATION: 98 % | WEIGHT: 201.25 LBS | BODY MASS INDEX: 30.5 KG/M2 | DIASTOLIC BLOOD PRESSURE: 82 MMHG

## 2023-11-27 DIAGNOSIS — Z13.1 SCREENING FOR DIABETES MELLITUS: ICD-10-CM

## 2023-11-27 DIAGNOSIS — R92.30 DENSE BREAST: ICD-10-CM

## 2023-11-27 DIAGNOSIS — Z00.00 ANNUAL PHYSICAL EXAM: Primary | ICD-10-CM

## 2023-11-27 DIAGNOSIS — R20.2 PARESTHESIAS: ICD-10-CM

## 2023-11-27 DIAGNOSIS — R92.2 DENSE BREAST: ICD-10-CM

## 2023-11-27 DIAGNOSIS — Z13.29 SCREENING FOR THYROID DISORDER: ICD-10-CM

## 2023-11-27 DIAGNOSIS — Z01.419 WELL WOMAN EXAM: ICD-10-CM

## 2023-11-27 DIAGNOSIS — Z13.0 SCREENING FOR DEFICIENCY ANEMIA: ICD-10-CM

## 2023-11-27 DIAGNOSIS — M54.50 ACUTE MIDLINE LOW BACK PAIN WITHOUT SCIATICA: ICD-10-CM

## 2023-11-27 DIAGNOSIS — Z12.31 ENCOUNTER FOR SCREENING MAMMOGRAM FOR MALIGNANT NEOPLASM OF BREAST: ICD-10-CM

## 2023-11-27 DIAGNOSIS — Z13.220 SCREENING FOR LIPOID DISORDERS: ICD-10-CM

## 2023-11-27 DIAGNOSIS — M54.10 BACK PAIN WITH RADICULOPATHY: ICD-10-CM

## 2023-11-27 DIAGNOSIS — N83.209 CYST OF OVARY, UNSPECIFIED LATERALITY: ICD-10-CM

## 2023-11-27 DIAGNOSIS — Z12.11 SCREENING FOR COLON CANCER: ICD-10-CM

## 2023-11-27 PROCEDURE — 99396 PREV VISIT EST AGE 40-64: CPT | Performed by: INTERNAL MEDICINE

## 2023-11-27 RX ORDER — METHYLPREDNISOLONE 4 MG/1
TABLET ORAL
Qty: 1 EACH | Refills: 0 | Status: SHIPPED | OUTPATIENT
Start: 2023-11-27

## 2023-11-27 RX ORDER — NALTREXONE HYDROCHLORIDE 50 MG/1
50 TABLET, FILM COATED ORAL DAILY
Qty: 90 TABLET | Refills: 1 | Status: SHIPPED | OUTPATIENT
Start: 2023-11-27

## 2023-11-30 ENCOUNTER — HOSPITAL ENCOUNTER (OUTPATIENT)
Dept: GENERAL RADIOLOGY | Age: 48
Discharge: HOME | End: 2023-11-30
Attending: INTERNAL MEDICINE
Payer: COMMERCIAL

## 2023-11-30 DIAGNOSIS — M54.50 ACUTE MIDLINE LOW BACK PAIN WITHOUT SCIATICA: ICD-10-CM

## 2023-11-30 PROCEDURE — 72100 X-RAY EXAM L-S SPINE 2/3 VWS: CPT | Performed by: INTERNAL MEDICINE

## 2023-12-06 ENCOUNTER — TELEPHONE (OUTPATIENT)
Dept: INTERNAL MEDICINE CLINIC | Facility: CLINIC | Age: 48
End: 2023-12-06

## 2023-12-06 NOTE — TELEPHONE ENCOUNTER
Please notify the patient that I reviewed the lumbar x-ray which shows multilevel degenerative changes of the lumbar spine without active fracture. This is likely consistent with her chronic intermittent low back pain. Should continue the home stretches and exercises. Continue with the current medications in place, notify us if still no improvement as we may need to escalate therapy further.

## (undated) NOTE — LETTER
2/25/2019              [address] [place] [address]         Dear Jayda Coreas records indicate that the tests ordered for you by Kimberly Ruiz MD  have not been done.   If you have, in fact, already completed the tests or